# Patient Record
Sex: FEMALE | Race: WHITE | URBAN - METROPOLITAN AREA
[De-identification: names, ages, dates, MRNs, and addresses within clinical notes are randomized per-mention and may not be internally consistent; named-entity substitution may affect disease eponyms.]

---

## 2020-12-27 ENCOUNTER — HOSPITAL ENCOUNTER (OUTPATIENT)
Age: 25
Setting detail: SPECIMEN
Discharge: HOME | End: 2020-12-27
Payer: COMMERCIAL

## 2020-12-27 ENCOUNTER — OFFICE VISIT (OUTPATIENT)
Dept: PRIMARY CARE CLINIC | Age: 25
End: 2020-12-27
Payer: COMMERCIAL

## 2020-12-27 VITALS
HEART RATE: 70 BPM | HEIGHT: 64 IN | WEIGHT: 158 LBS | BODY MASS INDEX: 26.98 KG/M2 | TEMPERATURE: 97.1 F | OXYGEN SATURATION: 96 %

## 2020-12-27 PROCEDURE — U0003 INFECTIOUS AGENT DETECTION BY NUCLEIC ACID (DNA OR RNA); SEVERE ACUTE RESPIRATORY SYNDROME CORONAVIRUS 2 (SARS-COV-2) (CORONAVIRUS DISEASE [COVID-19]), AMPLIFIED PROBE TECHNIQUE, MAKING USE OF HIGH THROUGHPUT TECHNOLOGIES AS DESCRIBED BY CMS-2020-01-R: HCPCS

## 2020-12-27 PROCEDURE — 99202 OFFICE O/P NEW SF 15 MIN: CPT | Performed by: NURSE PRACTITIONER

## 2020-12-27 ASSESSMENT — PATIENT HEALTH QUESTIONNAIRE - PHQ9
2. FEELING DOWN, DEPRESSED OR HOPELESS: 0
SUM OF ALL RESPONSES TO PHQ QUESTIONS 1-9: 0
SUM OF ALL RESPONSES TO PHQ QUESTIONS 1-9: 0
1. LITTLE INTEREST OR PLEASURE IN DOING THINGS: 0
SUM OF ALL RESPONSES TO PHQ QUESTIONS 1-9: 0
SUM OF ALL RESPONSES TO PHQ9 QUESTIONS 1 & 2: 0

## 2020-12-27 ASSESSMENT — ENCOUNTER SYMPTOMS
DIARRHEA: 0
VOMITING: 0
SORE THROAT: 0
COUGH: 1
RHINORRHEA: 1
SHORTNESS OF BREATH: 0

## 2020-12-27 NOTE — PROGRESS NOTES
[address]  [address] [facility] [address] [address]  Dept: [phone]  Dept Fax: [phone]    Pro Siegel is a 22 y.o. female who presents to the urgent care today for her medicalconditions/complaints as noted below. Pro Siegel is c/o of Concern For COVID-19 (fever, chills, loss of smell/taste, congestion,X1 day, no known exposure)      HPI:         55-year-old female patient presents with complaint of multiple Covid symptoms. Reports for approximately 1 days she has had fevers, chills, generalized fatigue, body aches and headache. Reports nasal congestion, rhinorrhea. Reports complete loss of taste and smell. Denies any cough. Denies chest pain or shortness of breath. Denies vomiting or diarrhea. Denies any known sick contacts. Treatments tried include Tylenol. History reviewed. No pertinent past medical history. No current outpatient medications on file. No current facility-administered medications for this visit. Allergies   Allergen Reactions    Penicillins Rash       Subjective:      Review of Systems   Constitutional: Positive for chills, fatigue and fever. HENT: Positive for congestion and rhinorrhea. Negative for sore throat. Respiratory: Positive for cough. Negative for shortness of breath. Cardiovascular: Negative for chest pain. Gastrointestinal: Negative for diarrhea and vomiting. Musculoskeletal: Positive for myalgias. Neurological: Positive for headaches. All other systems reviewed and are negative. Objective:     Physical Exam  Vitals signs and nursing note reviewed. Constitutional:       General: She is not in acute distress. Appearance: Normal appearance. She is not toxic-appearing. HENT:      Nose: Congestion and rhinorrhea present. Mouth/Throat:      Pharynx: No posterior oropharyngeal erythema. Cardiovascular:      Rate and Rhythm: Normal rate.    Pulmonary: Effort: Pulmonary effort is normal. No respiratory distress. Breath sounds: Normal breath sounds. Skin:     General: Skin is warm and dry. Neurological:      General: No focal deficit present. Mental Status: She is alert and oriented to person, place, and time. Pulse 70   Temp 97.1 °F (36.2 °C) (Infrared)   Ht 5' 4\" (1.626 m)   Wt 158 lb (71.7 kg)   SpO2 96%   BMI 27.12 kg/m²   Lab Review   No visits with results within 2 Month(s) from this visit. Latest known visit with results is:   No results found for any previous visit. Assessment:       Diagnosis Orders   1. Suspected COVID-19 virus infection  COVID-19 Ambulatory       Plan:      Return if symptoms worsen or fail to improve. No orders of the defined types were placed in this encounter. Recommend isolation pending covid results. Discussed treatment regimen to include rest, hydration, tylenol prn. Discussed deep breathing exercises. Discussed to monitor for progression of symptoms. Follow up as needed. Will contact patient for results  ER for acutely worsening symptoms. Patient given educational materials - see patient instructions. Discussed use, benefit, and side effects of prescribed medications. All patientquestions answered. Pt voiced understanding. This note was transcribed using dictation with Dragon services. Efforts were made to correct any errors but some words may be misinterpreted.      Electronically signed by MUNIRA Motley CNP on 12/27/2020at 1:43 PM

## 2020-12-29 LAB — SARS-COV-2, NAA: DETECTED

## 2020-12-30 ENCOUNTER — TELEPHONE (OUTPATIENT)
Dept: PRIMARY CARE CLINIC | Age: 25
End: 2020-12-30